# Patient Record
(demographics unavailable — no encounter records)

---

## 2024-11-07 NOTE — HISTORY OF PRESENT ILLNESS
[FreeTextEntry1] : 66-year-old female with a PMHx of HTN, bradycardia, CAD s/p CABG  [facility] 2024: Severe 3-vessel disease, including diffuse LAD disease and D1 bifurcation. S/p CABG 5/2024  Pt presents for a follow up visit.  Sternal incision healed  Denies chest pain, SOB, or palpitations. No edema.  S/p  cardiac rehab.  Had 2 episodes of blood-tinged sputum.    08/20/24  Chol 138 LDL 68 Trig 80  A1c 5.5  GFR 55.

## 2024-11-07 NOTE — ASSESSMENT
[FreeTextEntry1] : 66 YOF Severe 3-vessel CAD. S/p CABG. Recovered well. HTN not well controlled Sinus bradycardia  Anemia after surgery, improving.  Plan: Continue Losartan 50 mg daily. Continue HCTZ 25 mg daily. D/c Toprol. Star Amlodipine 5 mg at bedtime.  Continue ASA and Clopidogrel. Continue Rosuvastatin 20 mg daily. Low-salt diet discussed again. Continue cardiac rehab. F/u in 3 months.  Dada Padilla MD

## 2024-11-07 NOTE — REVIEW OF SYSTEMS
[Negative] : Neurological [Dyspnea on exertion] : not dyspnea during exertion [Chest Discomfort] : no chest discomfort [Lower Ext Edema] : no extremity edema [Leg Claudication] : no intermittent leg claudication

## 2024-11-07 NOTE — CARDIOLOGY SUMMARY
[de-identified] : 8/15/24: SB 49bpm, IRBBB, NSST changes.  [de-identified] : [facility] 2024: CAC 1591 Calcified and noncalcified plaque within the left main coronary artery, LAD LCx and RCA resulting in multiple foci moderate and severe narrowing CAD-RADS 4B

## 2024-12-16 NOTE — HISTORY OF PRESENT ILLNESS
[de-identified] : Patient presents today c/o  clogged ears, epistaxis.  Patient states since October  she has been having right side nostril congestion. Sounds like she is under water from her right ear.  She has some blood in her mucus in her throat and when she has a runny nose. Denies any otalgia or otorrhea. Used Flonase with improvement.  Has quadruple bypass 7 months ago. Currently taking Plavix.

## 2024-12-16 NOTE — PROCEDURE
[Epistaxis] : evaluation of epistaxis [None] : none [Flexible Endoscope] : examined with the flexible endoscope [FreeTextEntry6] : Bilateral nasal endoscopy performed. No mucosal masses or lesions. Bilateral ostiomeatal complexes are clear without discharge.  Right anterior caudal septum with exposed vessel, no active bleeding

## 2025-02-18 NOTE — ASSESSMENT
[FreeTextEntry1] : 66 YOF Severe 3-vessel CAD. S/p CABG. Recovered well. HTN better controlled Sinus bradycardia, still asymptomatic, good chronotropic response.  Anemia after surgery, improving. No cardiac contraindications to colonoscopy.  Plan: Continue Losartan 50 mg daily. Continue HCTZ 25 mg daily. Continue Amlodipine 10 mg at bedtime.  Continue ASA and Clopidogrel. Can stop Clopidogrel for 7 days for colonoscopy. Continue Rosuvastatin 20 mg daily. Low-salt diet discussed again. Continue cardiac rehab. Repeat blood work ordered. F/u in 4 months.  Dada Padilla MD

## 2025-02-18 NOTE — CARDIOLOGY SUMMARY
[de-identified] : 2/18/25: SB 48bpm, IRBBB, NSST changes.  [de-identified] : [facility] 2024: CAC 1591 Calcified and noncalcified plaque within the left main coronary artery, LAD LCx and RCA resulting in multiple foci moderate and severe narrowing CAD-RADS 4B

## 2025-02-18 NOTE — HISTORY OF PRESENT ILLNESS
[FreeTextEntry1] : 66-year-old female with a PMHx of HTN, bradycardia, CAD s/p CABG  [facility] 2024: Severe 3-vessel disease, including diffuse LAD disease and D1 bifurcation. S/p CABG 5/2024  Pt presents for a follow up visit.  Had a nurse home visit in December, amlodipine was increased to 10mg. Denies chest pain, SOB, or palpitations. No edema. Doing cardiac rehab. Asking when she can under colonoscopy.   08/20/24  Chol 138 LDL 68 Trig 80  A1c 5.5  GFR 55.

## 2025-04-17 NOTE — HISTORY OF PRESENT ILLNESS
[FreeTextEntry1] : patient returns today for a follow up visit for a subsequent evaluation for clogged ears, epistaxis.  patient states that she been feeling okay since visit, PT states that Monday her nose started running blood from left nostril. states there is no pain anywhere

## 2025-04-17 NOTE — PROCEDURE
[Epistaxis] : evaluation of epistaxis [None] : none [Rigid Endoscope] : examined with a rigid endoscope [FreeTextEntry6] : Bilateral nasal endoscopy performed. No mucosal masses or lesions. Bilateral ostiomeatal complexes are clear without discharge.  Left anterior caudal septum with exposed vessel and bleeding. Controlled with endoscopically directed silver nitrate cautery. Tolerated well.

## 2025-06-24 NOTE — HISTORY OF PRESENT ILLNESS
[FreeTextEntry1] : 66-year-old female with a PMHx of HTN, bradycardia, CAD s/p CABG  [facility] 2024: Severe 3-vessel disease, including diffuse LAD disease and D1 bifurcation. S/p CABG 5/2024  Pt presents for a follow up visit.   Denies chest pain, SOB, or palpitations. No edema. Continues to lose weight.   GFR 55 LDL 81 A1c 6.1 TSH 1.41  .

## 2025-06-24 NOTE — CARDIOLOGY SUMMARY
[de-identified] : 6/24/25: SB 44bpm, IRBBB, NSST changes.  [de-identified] : [facility] 2024: CAC 1591 Calcified and noncalcified plaque within the left main coronary artery, LAD LCx and RCA resulting in multiple foci moderate and severe narrowing CAD-RADS 4B

## 2025-06-24 NOTE — ASSESSMENT
[FreeTextEntry1] : 66 YOF Severe 3-vessel CAD. S/p CABG. Recovered well. Lost 44 pounds since surgery. HTN better controlled Sinus bradycardia, still asymptomatic, good chronotropic response.  Anemia after surgery, resolved. HLD still uncontrolled.  Plan: Continue Losartan 50 mg daily. Continue HCTZ 25 mg daily. Continue Amlodipine 10 mg at bedtime.  Continue ASA, d/c Clopidogrel.  Continue Rosuvastatin 20 mg daily. Add Zetia. Low-salt diet discussed again. Proceed with colonoscopy. Repeat blood work ordered. F/u in 6 months.  Dada Padilla MD